# Patient Record
Sex: FEMALE | Race: BLACK OR AFRICAN AMERICAN
[De-identification: names, ages, dates, MRNs, and addresses within clinical notes are randomized per-mention and may not be internally consistent; named-entity substitution may affect disease eponyms.]

---

## 2018-01-27 ENCOUNTER — HOSPITAL ENCOUNTER (EMERGENCY)
Dept: HOSPITAL 62 - ER | Age: 48
Discharge: HOME | End: 2018-01-27
Payer: MEDICAID

## 2018-01-27 VITALS — DIASTOLIC BLOOD PRESSURE: 61 MMHG | SYSTOLIC BLOOD PRESSURE: 103 MMHG

## 2018-01-27 DIAGNOSIS — F10.129: ICD-10-CM

## 2018-01-27 DIAGNOSIS — S02.2XXA: Primary | ICD-10-CM

## 2018-01-27 DIAGNOSIS — F17.200: ICD-10-CM
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
## 2018-01-27 DIAGNOSIS — S01.83XA: ICD-10-CM

## 2018-01-27 PROCEDURE — 70450 CT HEAD/BRAIN W/O DYE: CPT

## 2018-01-27 PROCEDURE — 90715 TDAP VACCINE 7 YRS/> IM: CPT

## 2018-01-27 PROCEDURE — 90471 IMMUNIZATION ADMIN: CPT

## 2018-01-27 PROCEDURE — 70486 CT MAXILLOFACIAL W/O DYE: CPT

## 2018-01-27 PROCEDURE — 99284 EMERGENCY DEPT VISIT MOD MDM: CPT

## 2018-01-27 PROCEDURE — 72125 CT NECK SPINE W/O DYE: CPT

## 2018-01-27 NOTE — RADIOLOGY REPORT (SQ)
EXAM DESCRIPTION: CT HEAD WITHOUT



CLINICAL HISTORY: fall, facial injury



COMPARISON: None available



TECHNIQUE: Axial CT of the head obtained from the skull apex to

the skull base without contrast.



FINDINGS: 

No acute intracranial hemorrhage identified. No mass, mass

effect, shift of the midline, abnormal extra-axial fluid

collection or CT evidence of acute ischemic change identified.

The ventricular system is unremarkable.  No acute abnormalities

of the supratentorial white matter, basal ganglia, cerebellum, or

brainstem.



The visualized paranasal sinuses and the mastoids are clear.

  No skull fracture identified.  Visualized orbits and globes are

unremarkable. Contusion/laceration in the right frontal scalp

subcutaneous soft tissues.



DLP:1162.97 mGy-cm



IMPRESSION:

1.  No acute intracranial abnormality.



This exam was performed according to our departmental

dose-optimization program, which includes automated exposure

control, adjustment of the mA and/or kV according to patient size

and/or use of iterative reconstruction technique.

## 2018-01-27 NOTE — ER DOCUMENT REPORT
HPI





- HPI


Patient complains to provider of: Facial injury


Onset: Just prior to arrival


Onset/Duration: Sudden


Quality of pain: Achy


Pain Level: 3


Context: 





Patient was drinking alcohol this evening and slipped down stairs hitting her 

face on pavement.  Patient denies any loss of consciousness, nausea or 

vomiting.  Behavior has been normal since the injury.  Patient has abrasions to 

the face and nose area.


Associated Symptoms: Headache, Other - Facial abrasions


Exacerbated by: Denies


Relieved by: Denies


Similar symptoms previously: No


Recently seen / treated by doctor: No





- ROS


ROS below otherwise negative: Yes


Systems Reviewed and Negative: Yes All other systems reviewed and negative





- CONSTITUTIONAL


Constitutional: DENIES: Fever





- NEURO


Neurology: DENIES: Headache





- GASTROINTESTINAL


Gastrointestinal: DENIES: Nausea, Patient vomiting





- DERM


Skin Color: Normal


Notes: 





Facial abrasions





Past Medical History





- General


Information source: Patient





- Social History


Smoking Status: Current Every Day Smoker


Frequency of alcohol use: Social


Drug Abuse: None


Family History: Reviewed & Not Pertinent


Patient has suicidal ideation: No


Patient has homicidal ideation: No





- Medical History


Medical History: Negative


Renal/ Medical History: Denies: Hx Peritoneal Dialysis


Past Surgical History: Reports: Hx  Section





Vertical Provider Document





- CONSTITUTIONAL


Agree With Documented VS: Yes


Exam Limitations: No Limitations


General Appearance: WD/WN, No Apparent Distress


Notes: 





Patient removed her cervical collar and does not want it replaced





- INFECTION CONTROL


TRAVEL OUTSIDE OF THE [place] IN LAST 30 DAYS: No





- HEENT


HEENT: Normal ENT Exam, Normocephalic, PERRLA


Notes: 





Patient with abrasion to forehead, bridge of nose and right infraorbital area.  

Extraocular movements intact.  Patient with puncture wound to right side of 

forehead.





- NECK


Neck: Normal Inspection, Supple.  negative: Lymphadenopathy-Left, 

Lymphadenopathy-Right


Notes: 





No posterior cervical midline tenderness, step-off or deformity





- RESPIRATORY


Respiratory: Breath Sounds Normal, No Respiratory Distress


O2 Sat by Pulse Oximetry: 100





- CARDIOVASCULAR


Cardiovascular: Regular Rate, Regular Rhythm, No Murmur





- BACK


Back: Normal Inspection


Notes: 





No spinal tenderness, step-off or deformity





- MUSCULOSKELETAL/EXTREMETIES


Musculoskeletal/Extremeties: MAEW, FROM





- NEURO


Level of Consciousness: Awake, Alert, Appropriate


Motor/Sensory: No Motor Deficit





- DERM


Integumentary: Warm, Dry


Notes: 





Puncture wound to right side of forehead with overlying abrasions to forehead, 

right infraorbital area and bridge of nose





Course





- Re-evaluation


Re-evalutation: 





18 06:11


Although patient is intoxicated, patient is awake, alert and answers questions 

appropriately.  Patient will not be driving home and will be going home in the 

care of friends who is sober and is advised of worsening signs or symptoms that 

she should return immediately for.  Patient advised that she will need to follow

-up with a ear nose and throat doctor for further evaluation of her nasal 

fracture.  Discussed wound management.


18 07:46








- Vital Signs


Vital signs: 


 











Temp Pulse Resp BP Pulse Ox


 


 98.3 F   88   16   103/61   100 


 


 18 04:02  18 04:02  18 04:02  18 04:02  18 04:02














- Diagnostic Test


Radiology reviewed: Reports reviewed





Discharge





- Discharge


Clinical Impression: 


 Alcohol abuse





Facial abrasion


Qualifiers:


 Encounter type: initial encounter Qualified Code(s): S00.81XA - Abrasion of 

other part of head, initial encounter





Nasal bone fracture


Qualifiers:


 Encounter type: initial encounter Fracture type: closed Qualified Code(s): 

S02.2XXA - Fracture of nasal bones, initial encounter for closed fracture





Condition: Stable


Disposition: HOME, SELF-CARE


Instructions:  Abrasions of the Face ([facility]), Acute Alcohol Intoxication ([facility]), 

Fracture of the Nose ([facility])


Additional Instructions: 


Return immediately for any new or worsening symptoms





Followup with your primary care provider, call tomorrow to make a followup 

appointment





Follow-up with an ear nose and throat doctor, call Monday for an appointment





Avoid use of alcohol





Keep wounds clean and cover with antibiotic ointment such as bacitracin daily


Referrals: 


ENT [Provider Group] - Follow up as needed


[facility] [Provider Group] - 18

## 2018-01-27 NOTE — RADIOLOGY REPORT (SQ)
EXAM DESCRIPTION: CT CERVICAL SPINE WITHOUT



CLINICAL HISTORY: fall, facial injury



COMPARISON: None available



TECHNIQUE: Axial CT of the cervical spine obtained without

contrast.



FINDINGS: 

Alignment of the cervical spine is maintained without evidence of

subluxation.  The atlantoaxial, atlantodental, and

occipitoatlantal intervals are preserved.  No fracture

identified. Vertebral body height preserved.  Prevertebral soft

tissues are unremarkable.



Mild loss of intervertebral disc height and endplate spondylosis

at C3/4 through C6/C7 with mild facet arthropathy. Mild osseous

neural foraminal narrowing. No central canal narrowing.  



Visualized skull base is intact.  No fracture of the visualized

facial bones. Visualized mastoid air cells and paranasal sinuses

are well aerated.



Visualized thyroid is unremarkable.  No cervical lymphadenopathy.

No pneumothorax in the visualized lung apices.

/Interstitial opacity in the lung apices.



DLP: 417.27 mGy-cm



IMPRESSION:

1.  No acute fracture or subluxation of the cervical spine.



2.  Moderate degenerative change of the cervical spine.



This exam was performed according to our departmental

dose-optimization program, which includes automated exposure

control, adjustment of the mA and/or kV according to patient size

and/or use of iterative reconstruction technique.

## 2018-01-27 NOTE — RADIOLOGY REPORT (SQ)
EXAM DESCRIPTION: CT FACIAL AREA WITHOUT



CLINICAL HISTORY: fall, facial injury



COMPARISON: None available



TECHNIQUE: Axial CT of the facial bones obtained without

contrast.



FINDINGS: 

Visualized orbital floors and walls are intact. Maxillary antral

walls and hard palate are intact. Nondisplaced bilateral nasal

bone fractures. The pterygoid plates and zygomatic processes are

intact. The mandible is intact without mandibular condylar

dislocation.



Globes and intraconal contents are unremarkable. Contusion in the

right frontal paranasal sinuses are well aerated. Visualized

mastoid air cells are well aerated. No abnormalities of

visualized intracranial contents. No abnormalities of the neck

soft tissues. No lymphadenopathy identified. Degenerative change

of the cervical spine.



Subcutaneous scalp soft tissues. DLP: 278.72 mGy-cm



IMPRESSION:

1.  Age-indeterminate nondisplaced bilateral nasal bone

fractures.



2.  No other facial bone fractures identified. Contusion in the

right frontal scalp soft tissues.



This exam was performed according to our departmental

dose-optimization program, which includes automated exposure

control, adjustment of the mA and/or kV according to patient size

and/or use of iterative reconstruction technique.

## 2019-10-23 ENCOUNTER — HOSPITAL ENCOUNTER (EMERGENCY)
Dept: HOSPITAL 62 - ER | Age: 49
Discharge: HOME | End: 2019-10-23
Payer: MEDICARE

## 2019-10-23 VITALS — DIASTOLIC BLOOD PRESSURE: 78 MMHG | SYSTOLIC BLOOD PRESSURE: 115 MMHG

## 2019-10-23 DIAGNOSIS — M54.31: Primary | ICD-10-CM

## 2019-10-23 DIAGNOSIS — Z21: ICD-10-CM

## 2019-10-23 DIAGNOSIS — F17.200: ICD-10-CM

## 2019-10-23 DIAGNOSIS — K21.9: ICD-10-CM

## 2019-10-23 LAB
ADD MANUAL DIFF: NO
ANION GAP SERPL CALC-SCNC: 11 MMOL/L (ref 5–19)
APPEARANCE UR: (no result)
APTT PPP: (no result) S
BASOPHILS # BLD AUTO: 0 10^3/UL (ref 0–0.2)
BASOPHILS NFR BLD AUTO: 0.6 % (ref 0–2)
BILIRUB UR QL STRIP: NEGATIVE
BUN SERPL-MCNC: 18 MG/DL (ref 7–20)
CALCIUM: 10 MG/DL (ref 8.4–10.2)
CHLORIDE SERPL-SCNC: 107 MMOL/L (ref 98–107)
CO2 SERPL-SCNC: 23 MMOL/L (ref 22–30)
EOSINOPHIL # BLD AUTO: 0 10^3/UL (ref 0–0.6)
EOSINOPHIL NFR BLD AUTO: 1.2 % (ref 0–6)
ERYTHROCYTE [DISTWIDTH] IN BLOOD BY AUTOMATED COUNT: 13.4 % (ref 11.5–14)
GLUCOSE SERPL-MCNC: 120 MG/DL (ref 75–110)
GLUCOSE UR STRIP-MCNC: NEGATIVE MG/DL
HCT VFR BLD CALC: 42.9 % (ref 36–47)
HGB BLD-MCNC: 14.6 G/DL (ref 12–15.5)
KETONES UR STRIP-MCNC: (no result) MG/DL
LYMPHOCYTES # BLD AUTO: 0.8 10^3/UL (ref 0.5–4.7)
LYMPHOCYTES NFR BLD AUTO: 20.8 % (ref 13–45)
MCH RBC QN AUTO: 32.8 PG (ref 27–33.4)
MCHC RBC AUTO-ENTMCNC: 34.1 G/DL (ref 32–36)
MCV RBC AUTO: 96 FL (ref 80–97)
MONOCYTES # BLD AUTO: 0.4 10^3/UL (ref 0.1–1.4)
MONOCYTES NFR BLD AUTO: 11.4 % (ref 3–13)
NEUTROPHILS # BLD AUTO: 2.6 10^3/UL (ref 1.7–8.2)
NEUTS SEG NFR BLD AUTO: 66 % (ref 42–78)
NITRITE UR QL STRIP: NEGATIVE
PH UR STRIP: 5 [PH] (ref 5–9)
PLATELET # BLD: 202 10^3/UL (ref 150–450)
POTASSIUM SERPL-SCNC: 3.8 MMOL/L (ref 3.6–5)
PROT UR STRIP-MCNC: 100 MG/DL
RBC # BLD AUTO: 4.46 10^6/UL (ref 3.72–5.28)
SP GR UR STRIP: 1.03
TOTAL CELLS COUNTED % (AUTO): 100 %
UROBILINOGEN UR-MCNC: 2 MG/DL (ref ?–2)
WBC # BLD AUTO: 3.9 10^3/UL (ref 4–10.5)

## 2019-10-23 PROCEDURE — 96374 THER/PROPH/DIAG INJ IV PUSH: CPT

## 2019-10-23 PROCEDURE — 99283 EMERGENCY DEPT VISIT LOW MDM: CPT

## 2019-10-23 PROCEDURE — 36415 COLL VENOUS BLD VENIPUNCTURE: CPT

## 2019-10-23 PROCEDURE — 96361 HYDRATE IV INFUSION ADD-ON: CPT

## 2019-10-23 PROCEDURE — 85025 COMPLETE CBC W/AUTO DIFF WBC: CPT

## 2019-10-23 PROCEDURE — 81001 URINALYSIS AUTO W/SCOPE: CPT

## 2019-10-23 PROCEDURE — 80048 BASIC METABOLIC PNL TOTAL CA: CPT

## 2019-10-23 NOTE — ER DOCUMENT REPORT
ED Medical Screen (RME)





- General


Chief Complaint: Back Pain


Stated Complaint: BACK PAIN


Time Seen by Provider: 10/23/19 15:44


Mode of Arrival: Ambulatory


Information source: Patient


Notes: 





Patient presents complaining of right lower back and groin pain for the past 2 

to 3 weeks.  Patient states pain radiates to the right lower extremity.  Patient

does have a history of sciatica.  Patient denies any fever or urinary symptoms. 

Patient does have a history of HIV and is not on any antiviral medication at 

this time.  Patient tachycardic in triage.





I have greeted and performed a rapid initial assessment of this patient.  A 

comprehensive ED assessment and evaluation of the patient, analysis of test 

results and completion of the medical decision making process will be conducted 

by additional ED providers.


TRAVEL OUTSIDE OF THE [place] IN LAST 30 DAYS: No





- Related Data


Allergies/Adverse Reactions: 


                                        





No Known Allergies Allergy (Verified 10/23/19 15:39)


   











Past Medical History





- Social History


Chew tobacco use (# tins/day): No


Frequency of alcohol use: None


Drug Abuse: None


Renal/ Medical History: Denies: Hx Peritoneal Dialysis


Past Surgical History: Reports: Hx  Section





Physical Exam





- Vital signs


Vitals: 





                                        











Temp Pulse Resp BP Pulse Ox


 


 98.4 F   124 H  18   115/78   96 


 


 10/23/19 15:20  10/23/19 15:20  10/23/19 15:20  10/23/19 15:20  10/23/19 15:20














- General


General appearance: Alert


Notes: 





No spinal midline tenderness





- Cardiovascular


Rhythm: Tachycardia


Heart sounds: S1 appreciated, S2 appreciated





Course





- Vital Signs


Vital signs: 





                                        











Temp Pulse Resp BP Pulse Ox


 


 98.4 F   124 H  18   115/78   96 


 


 10/23/19 15:20  10/23/19 15:20  10/23/19 15:20  10/23/19 15:20  10/23/19 15:20

## 2019-10-23 NOTE — ER DOCUMENT REPORT
ED General





- General


Chief Complaint: Back Pain


Stated Complaint: BACK PAIN


Time Seen by Provider: 10/23/19 15:44


Mode of Arrival: Ambulatory


TRAVEL OUTSIDE OF THE [place] IN LAST 30 DAYS: No





- HPI


Notes: 





Patient is a 48-year-old female presents emergency department for evaluation of 

pain in her right hip and buttock area that radiates down the right lateral leg.

 She states it is her sciatica.  She has a history of sciatica.  She states it 

feels similar.  She denies any bowel or bladder incontinence, no saddle 

anesthesia, no focal numbness or weakness.  No fevers or chills.  No nausea or 

vomiting.  She states that occasionally she feels as if her food gets stuck in 

her lower esophagus.  This is only been going on over the last week or so.  She 

denies any regurgitation of her food.  No melena or hematochezia.  She states 

she was started on Prevacid a few years ago when she had an EGD performed in 

[place], but stopped taking that.  Patient also notes that she is not taking any

antiretrovirals at this time.  She states she had some difficulties when she 

moved out of the area after [place].  She is reestablish care in the area, is 

already establishing care with somebody to manage her HIV.





- Related Data


Allergies/Adverse Reactions: 


                                        





No Known Allergies Allergy (Verified 10/23/19 15:39)


   











Past Medical History





- General


Information source: Patient





- Social History


Smoking Status: Current Every Day Smoker


Chew tobacco use (# tins/day): No


Frequency of alcohol use: None


Drug Abuse: None


Family History: Reviewed & Not Pertinent


Patient has suicidal ideation: No


Patient has homicidal ideation: No


Renal/ Medical History: Denies: Hx Peritoneal Dialysis


GI Medical History: Reports: Hx Gastroesophageal Reflux Disease


Musculoskeletal Medical History: Reports Other - Sciatica


Infectious Medical History: Reports: Hx HIV


Past Surgical History: Reports: Hx  Section





Review of Systems





- Review of Systems


Constitutional: No symptoms reported


EENT: No symptoms reported


Cardiovascular: No symptoms reported


Respiratory: No symptoms reported


Gastrointestinal: See HPI


Genitourinary: No symptoms reported


Musculoskeletal: See HPI


Skin: No symptoms reported


Neurological/Psychological: No symptoms reported





Physical Exam





- Vital signs


Vitals: 





                                        











Temp Pulse Resp BP Pulse Ox


 


 98.4 F   124 H  18   115/78   96 


 


 10/23/19 15:20  10/23/19 15:20  10/23/19 15:20  10/23/19 15:20  10/23/19 15:20














- Notes


Notes: 





Vital signs reviewed, please refer to chart. Head is normocephalic, atraumatic. 

Pupils equal round, reactive to light.  Neck is supple without meningismus.  

Heart is regular rate and rhythm.  Lungs are clear to auscultation bilaterally. 

Abdomen is soft, nontender, normoactive bowel sounds throughout.  Extremities 

without cyanosis, clubbing. Posterior calves are nontender.  Peripheral pulses 

are equal.  Skin is warm and dry.  Emanation of the spine is no midline 

tenderness or step-off.  She has paraspinal musculature tenderness noted on the 

right from L3-L5, into the SI joint, and overlying the right piriformis.  

Positive straight leg raise on the right, negative on the left.  Patellar and 

Achilles reflexes are 2+ bilaterally, sensation is intact.





Course





- Re-evaluation


Re-evalutation: 





10/23/19 18:27


Patient presents to the emergency department for evaluation.  She laboratory 

vesication's and urinalysis is ordered through triage.  Urinalysis was 

contaminated.  Laboratory investigations are largely unremarkable.  The 

importance of good medications and HIV control were stressed to the patient.  I 

also stressed to her that she should probably restart her Prevacid as directed 

by GI.  She voiced understanding to this.  She was given Percocet and Toradol 

here.  I will send her home with prescription for steroids and muscle relaxers. 

She is to follow-up with primary care, return to the ED with worsening or new 

concerning symptoms of any sort.





- Vital Signs


Vital signs: 





                                        











Temp Pulse Resp BP Pulse Ox


 


 98.4 F   124 H  18   115/78   96 


 


 10/23/19 15:20  10/23/19 15:20  10/23/19 15:20  10/23/19 15:20  10/23/19 15:20














- Laboratory


Result Diagrams: 


                                 10/23/19 16:30





                                 10/23/19 16:30


Laboratory results interpreted by me: 





                                        











  10/23/19 10/23/19 10/23/19





  16:00 16:30 16:30


 


WBC   3.9 L 


 


Glucose    120 H


 


Urine Protein  100 H  


 


Urine Ketones  TRACE H  


 


Urine Blood  MODERATE H  


 


Urine Urobilinogen  2.0 H  


 


Ur Leukocyte Esterase  MODERATE H  


 


Urine Ascorbic Acid  20 H  














Discharge





- Discharge


Clinical Impression: 


Sciatica


Qualifiers:


 Laterality: right Qualified Code(s): M54.31 - Sciatica, right side





GERD (gastroesophageal reflux disease)


Qualifiers:


 Esophagitis presence: esophagitis presence not specified Qualified Code(s): 

K21.9 - Gastro-esophageal reflux disease without esophagitis





Condition: Stable


Disposition: HOME, SELF-CARE


Instructions:  Sciatica ([facility])


Additional Instructions: 


Moist heat to the painful area.  Take medications as prescribed, being sure to 

take the prednisone as directed until gone.  You should consider restarting your

Prevacid, this is available over-the-counter, and may be helpful with your acid 

reflux/esophageal issues.  Follow-up with primary care next week.  Return to the

emergency department with worsening or new concerning symptoms of any sort.


Forms:  Smoking Cessation Education